# Patient Record
Sex: FEMALE | Race: WHITE | URBAN - METROPOLITAN AREA
[De-identification: names, ages, dates, MRNs, and addresses within clinical notes are randomized per-mention and may not be internally consistent; named-entity substitution may affect disease eponyms.]

---

## 2017-10-14 ENCOUNTER — TRANSFERRED RECORDS (OUTPATIENT)
Dept: HEALTH INFORMATION MANAGEMENT | Facility: CLINIC | Age: 22
End: 2017-10-14

## 2017-10-14 LAB
ALT SERPL-CCNC: 30 U/L (ref 6–29)
AST SERPL-CCNC: 20 U/L (ref 10–30)
CHOLEST SERPL-MCNC: 209 MG/DL
CREAT SERPL-MCNC: 0.6 MG/DL (ref 0.5–1.1)
GFR SERPL CREATININE-BSD FRML MDRD: 131 ML/MIN/1.73M2
GLUCOSE SERPL-MCNC: 76 MG/DL (ref 65–99)
HDLC SERPL-MCNC: 68 MG/DL
LDLC SERPL CALC-MCNC: 127 MG/DL
NONHDLC SERPL-MCNC: 141 MG/DL
POTASSIUM SERPL-SCNC: 3.6 MMOL/L (ref 3.5–5.3)
TRIGL SERPL-MCNC: 58 MG/DL
TSH SERPL-ACNC: 2.79 MIU/L

## 2017-12-01 ENCOUNTER — DOCUMENTATION ONLY (OUTPATIENT)
Dept: ALLERGY | Facility: CLINIC | Age: 22
End: 2017-12-01

## 2017-12-01 NOTE — PROGRESS NOTES
This patient has an upcoming appointment. I received blood testing records from pediatrician in [facility].     Egg white 0.80  Peanut 0.70  Wheat 0.77  [place] 0.18  Codfish less than 0.10  Milk 0.11  Soy bean 0.25  Shrimp less than 0.10  Scallop less than 0.10  Sesame seed 0.83  Hazelnut 0.50  Cashew 0.15  [place] 0.20  Thor less than 0.10  Tuna less than 0.10    Absolute eosinophil count of 561. Normal TSH. AST slightly elevated at 30. Otherwise normal T4, T3, thyroglobulin antibodies, thyroid peroxidase antibodies, CBC, vitamin D, CMP.

## 2017-12-04 ENCOUNTER — OFFICE VISIT (OUTPATIENT)
Dept: ALLERGY | Facility: CLINIC | Age: 22
End: 2017-12-04
Payer: COMMERCIAL

## 2017-12-04 VITALS
DIASTOLIC BLOOD PRESSURE: 66 MMHG | HEIGHT: 63 IN | HEART RATE: 78 BPM | TEMPERATURE: 97.9 F | OXYGEN SATURATION: 99 % | BODY MASS INDEX: 24.13 KG/M2 | RESPIRATION RATE: 16 BRPM | WEIGHT: 136.2 LBS | SYSTOLIC BLOOD PRESSURE: 131 MMHG

## 2017-12-04 DIAGNOSIS — R14.3 FLATULENCE, ERUCTATION, AND GAS PAIN: ICD-10-CM

## 2017-12-04 DIAGNOSIS — K90.49 FOOD INTOLERANCE IN ADULT: ICD-10-CM

## 2017-12-04 DIAGNOSIS — J30.1 CHRONIC SEASONAL ALLERGIC RHINITIS DUE TO POLLEN: Primary | ICD-10-CM

## 2017-12-04 DIAGNOSIS — J30.81 ALLERGIC RHINITIS DUE TO ANIMAL DANDER: ICD-10-CM

## 2017-12-04 DIAGNOSIS — R14.2 FLATULENCE, ERUCTATION, AND GAS PAIN: ICD-10-CM

## 2017-12-04 DIAGNOSIS — R14.1 FLATULENCE, ERUCTATION, AND GAS PAIN: ICD-10-CM

## 2017-12-04 DIAGNOSIS — J30.89 ALLERGIC RHINITIS DUE TO MOLD: ICD-10-CM

## 2017-12-04 DIAGNOSIS — J30.89 ALLERGIC RHINITIS DUE TO DUST MITE: ICD-10-CM

## 2017-12-04 PROCEDURE — 99204 OFFICE O/P NEW MOD 45 MIN: CPT | Mod: 25 | Performed by: ALLERGY & IMMUNOLOGY

## 2017-12-04 PROCEDURE — 95004 PERQ TESTS W/ALRGNC XTRCS: CPT | Performed by: ALLERGY & IMMUNOLOGY

## 2017-12-04 RX ORDER — FLUTICASONE PROPIONATE 50 MCG
2 SPRAY, SUSPENSION (ML) NASAL DAILY
Qty: 1 BOTTLE | Refills: 11 | Status: SHIPPED | OUTPATIENT
Start: 2017-12-04

## 2017-12-04 NOTE — MR AVS SNAPSHOT
After Visit Summary   12/4/2017    Sahra Escalona    MRN: [id number]           Patient Information     Date Of Birth          1995        Visit Information        Provider Department      12/4/2017 3:20 PM Alber Friedman DO [facility]        Today's Diagnoses     Chronic seasonal allergic rhinitis due to pollen    -  1    Flatulence, eructation, and gas pain          Care Instructions    Allergy Staff Appt Hours Shot Hours Locations    Physician     Alber Friedman DO       Support Staff     KWESI Jacobo MA  Monday:                      [place] 8-7     Tuesday:         [place] 8-5     Wednesday:        [place]: 7-5     Friday:        [place] 7-5   [place]        Monday: 9-6        Friday: 7-2     [place]        Tuesday: 7-10:45        Thursday: 1:30-6:30     [place] Tuesday: 1-7        Wednesday: 11-6 Thursday: 7-12 [facility]  [address] [place] [address]  Appt Line: [phone]  Allergy RN (Monday):  [phone]    [facility]  [address] [place] [address]  Appt Line: [phone]  Allergy RN (Tues & Wed):  [phone]    [facility]  [address] [place] [address]  Appt Line: [phone]  Allergy RN (Friday):  [phone]       Important Scheduling Information  Aspirin Desensitization: Appt will last 2 clinic days. Please call the Allergy RN line for your clinic to schedule. Discontinue antihistamines 7 days prior to the appointment.     Food Challenges: Appt will last 3-4 hours. Please call the Allergy RN line for your clinic to schedule. Discontinue antihistamines 7 days prior to the appointment.     Penicillin Testing: Appt will last 2-3 hours. Please call the Allergy RN line for your clinic to schedule. Discontinue antihistamines 7 days prior to the appointment.     Skin Testing: Appt will about 40 minutes. Call the appointment line for your clinic to schedule. Discontinue  antihistamines 7 days prior to the appointment.     Venom Testing: Appt will last 2-3 hours. Please call the Allergy RN line for your clinic to schedule. Discontinue antihistamines 7 days prior to the appointment.     Thank you for trusting us with your Allergy, Asthma, and Immunology care. Please feel free to contact us with any questions or concerns you may have.      - Flonase 2 sprays/nostril daily in the spring through fall.   - Allergen avoidance measures as noted below.   - Zyrtec 10mg by mouth daily.   - GI referral.   - I do not think you have food allergies, but maybe intolerances. Try reintroducing foods one at a time to determine if you tolerate.       AEROALLERGEN AVOIDANCE INSTRUCTIONS  MOLD  Indoors, mold season is year round. Outdoors, most mold prefer seasons with high humidity. Mold prefers damp, dark, warm places. Here are some tips on how to avoid mold exposure.  1.  Keep humidity inside between 35-50% with air conditioning or dehumidifier. The humidity level can be checked with a meter from a hardware store.   2.  Clean surfaces where mold grows and dry wet areas.  3.  Avoid steam cleaning carpets and discard moldy belongings.  4.  Wear a mask when doing yard work and refrain from walking through uncut fields or playing in leaves.  5.  Minimize use of potted plants and do not keep them indoors.  6.  Consider an allergy cover for the pillow and mattress.  POLLEN  Pollens are the tiny airborne particles given off by trees, weeds, and grasses. They can be the cause of seasonal allergic rhinitis or hay fever symptoms, which include stuffy, itchy, runny nose, redness, swelling and itching of the eyes, and itching of the ears and throat. Here are some tips on how to avoid pollen exposure.  1. .Keep windows closed and use the air conditioner when possible.  2.  Avoid outside exposure in the early morning as pollen counts are highest at that time.  3.  Take a shower and wash hair each night.  4.   Consider wearing a mask when working in the yard and/or garden.  5.  Clean furnace filter monthly with HEPA filters. Consider a HEPA filter vacuum  which will prevent pollen from being reintroduced into the air.   DUST MITES  Dust mites can never be entirely eliminated in the house no matter how clean your house is. Dust mites are attracted to warm, moist areas and feed on dead skin flakes. Here are tips to minimize dust mites in your home.  1.  Encase pillows and mattress/box springs in zippered allergy covers.  2.  Wash bedding in hot water (at least 130 F) every 7-14 days.  3.  Avoid curtains, carpet, and upholstered furniture if possible.  4.  Use HEPA air filters and a HEPA filter vacuum . Change filters monthly. Vacuum weekly.  5.  Keep bedroom simple, avoiding clutter, so it can quickly be dusted.  6.  Cover heating vents with vent filters.  7.  Keep stuffed toys in a closed container and wash or freeze regularly.  8.  Keep clothing in the closet with the door closed.  PETS  Pets present many problems for people with allergies. Dander from pets is very difficult to remove and also is a food source for dust mites.  1.  If possible, find the pet a new home.  2.  If not possible, keep the pet outdoors. Never allow the pet into the bedroom.  3.  Wash pet weekly in warm water.  4.  Encase mattresses, pillows, and box springs in allergen-proof covers.  5.  Use HEPA air filters and a HEPA filter vacuum . Change filters monthly.      Controlling Allergens: Dust Mites  Constant exposure to allergens means constant allergy symptoms. That s why controlling or avoiding the allergens that cause your symptoms is an important part of your treatment. If you are allergic to dust mites, the tips below can help to lessen your exposure to dust mites.     Wash all bedding in hot water.   Dust mite allergy  Dust mites are a common cause of nasal allergies. These mites are tiny organisms that live in bedding,  upholstered furniture, and carpet. They live in warm, humid conditions. House-dust mites are almost impossible to get rid of. But you can keep them under control.  Make changes to your home  Some furniture, like sofas and chairs, hold dust mites. To lessen the problem:    Choose nonfabric upholstery, like leather or vinyl.    Replace horizontal blinds with pull-down shades or vertical blinds.    Use washable curtains instead of heavy drapes.    Have as little carpeting as possible.    Cover your mattress, box spring, and pillows in allergy-proof casings.  Housecleaning  Here are some tips:    Wash sheets, blankets, and mattress pads every 1 to 2 weeks in hot water (at least 130 F).    Remove stuffed animals and other things that collect dust, such as wall hangings, knickknacks, and books--especially in the bedroom.    Dust your home every week with a damp cloth. Vacuum once a week. Use HEPA (high efficiency particulate air) filters or double-ply bags in the vacuum . Or, use a vacuum designed to lessen allergens.    If someone else can t dust and vacuum for you, wearing a filter mask may help.  Reduce indoor humidity  Dust mites need moist air to live. Use a dehumidifier to reduce air moisture. Don t use humidifiers, or vaporizers.  Talk with your healthcare provider about other ways to reduce dust in your home. Ask about medicines that can help with your allergy symptoms.  Date Last Reviewed: 9/1/2016 2000-2017 [employer]. [address] [place] [address], [place] [address]. All rights reserved. This information is not intended as a substitute for professional medical care. Always follow your healthcare professional's instructions.        Controlling Allergens: Dust Mites in the Bedroom    Many people with asthma are allergic to dust mites. Dust mites are tiny bugs that live in warm, damp places. They are too small to see, but they live in mattresses, pillows, upholstered furniture, and house dust.  Dust mite allergy can cause asthma flare-ups. If you have this allergy, there are many steps you can take to control dust mites at home.  Take these steps to control dust mites in your bed and bedroom:  1. Keep all clothing in a closet, with the door shut.  2. Make sure the room is not too humid. It should be below 50% humidity.  3. Choose wood, leather, or vinyl for furniture instead of upholstery.  4. Wash all bedding, pillows, and stuffed toys in hot water (130 F) every week. Remove any items that cannot be washed.  5. Use special covers on pillows, mattresses, and box springs. These covers are made for people with allergies.  6. If you can, replace carpeting with tile or hardwood zuleima. Use washable throw rugs, or don't use rugs at all.  7. Dust furniture with a damp cloth at least once a week.  8. Use an air conditioner or a dehumidifier to reduce humidity and filter the air. Clean the filter regularly.  9. Use pull-down shades or vertical window blinds that can be easily cleaned. Use these instead of curtains or drapes.  10. Use filters over heater vents.  Date Last Reviewed: 10/1/2016    1054-8383 [employer]. [address] [place] [address], [place] [id number]. All rights reserved. This information is not intended as a substitute for professional medical care. Always follow your healthcare professional's instructions.                Follow-ups after your visit        Additional Services     GASTROENTEROLOGY ADULT REF CONSULT ONLY       Preferred Location: [facility] [phone]      Please be aware that coverage of these services is subject to the terms and limitations of your health insurance plan.  Call member services at your health plan with any benefit or coverage questions.  Any procedures must be performed at a [facility] facility OR coordinated by your clinic's referral office.    Please bring the following with you to your appointment:    (1) Any X-Rays, CTs or MRIs which have been performed.   "Contact the facility where they were done to arrange for  prior to your scheduled appointment.    (2) List of current medications   (3) This referral request   (4) Any documents/labs given to you for this referral                  Who to contact     If you have questions or need follow up information about today's clinic visit or your schedule please contact [facility] directly at [phone].  Normal or non-critical lab and imaging results will be communicated to you by MyChart, letter or phone within 4 business days after the clinic has received the results. If you do not hear from us within 7 days, please contact the clinic through [employer] or phone. If you have a critical or abnormal lab result, we will notify you by phone as soon as possible.  Submit refill requests through [employer] or call your pharmacy and they will forward the refill request to us. Please allow 3 business days for your refill to be completed.          Additional Information About Your Visit        [employer] Information     [employer] lets you send messages to your doctor, view your test results, renew your prescriptions, schedule appointments and more. To sign up, go to [email or web address] . Click on \"Log in\" on the left side of the screen, which will take you to the Welcome page. Then click on \"Sign up Now\" on the right side of the page.     You will be asked to enter the access code listed below, as well as some personal information. Please follow the directions to create your username and password.     Your access code is: [email or web address]  Expires: 3/4/2018  4:45 PM     Your access code will  in 90 days. If you need help or a new code, please call your [facility] or [phone].        Care EveryWhere ID     This is your Care EveryWhere ID. This could be used by other organizations to access your [facility] medical records  [id number]        Your Vitals Were     Pulse Temperature Respirations Height Pulse " "Oximetry BMI (Body Mass Index)    78 97.9  F (36.6  C) (Oral) 16 1.594 m (5' 2.76\") 99% 24.31 kg/m2       Blood Pressure from Last 3 Encounters:   12/04/17 131/66    Weight from Last 3 Encounters:   12/04/17 61.8 kg (136 lb 3.2 oz)              We Performed the Following     GASTROENTEROLOGY ADULT REF CONSULT ONLY          Today's Medication Changes          These changes are accurate as of: 12/4/17  4:47 PM.  If you have any questions, ask your nurse or doctor.               Start taking these medicines.        Dose/Directions    fluticasone 50 MCG/ACT spray   Commonly known as:  FLONASE   Used for:  Chronic seasonal allergic rhinitis due to pollen   Started by:  Alber Friedman,         Dose:  2 spray   Spray 2 sprays into both nostrils daily   Quantity:  1 Bottle   Refills:  11            Where to get your medicines      These medications were sent to [facility] [address] [place] [address] [address] AT [place] [id number], [facility] [id number]    Hours:  24-hours Phone:  [phone]     fluticasone 50 MCG/ACT spray                Primary Care Provider Fax #    Physician No Ref-Primary [phone]       No address on file        Equal Access to Services     JOSEFINA MEDINA AH: Hadii obdulio galvan hadasho Soomaali, waaxda luqadaha, qaybta kaalmada adeegyada, waxay andrea haypaul white. So [facility] [phone].    ATENCIÓN: Si habla español, tiene a hampton disposición servicios gratuitos de asistencia lingüística. Llame al [phone].    We comply with applicable federal civil rights laws and [place] laws. We do not discriminate on the basis of race, color, national origin, age, disability, sex, sexual orientation, or gender identity.            Thank you!     Thank you for [facility]  [facility]. Our goal is always to provide you with excellent care. Hearing back from our patients is one way we can continue to improve our services. Please " take a few minutes to complete the written survey that you may receive in the mail after your visit with us. Thank you!             Your Updated Medication List - Protect others around you: Learn how to safely use, store and throw away your medicines at www.disposemymeds.org.          This list is accurate as of: 12/4/17  4:47 PM.  Always use your most recent med list.                   Brand Name Dispense Instructions for use Diagnosis    fluticasone 50 MCG/ACT spray    FLONASE    1 Bottle    Spray 2 sprays into both nostrils daily    Chronic seasonal allergic rhinitis due to pollen

## 2017-12-04 NOTE — PATIENT INSTRUCTIONS
Allergy Staff Appt Hours Shot Hours Locations    Physician     Alber Friedman DO       Support Staff     Deepti CHAVEZ RN      Ladonna GO MA  Monday:                      [place] 8-7 Tuesday:         [place] 8-5 Wednesday:        [place]: 7-5     Friday:        [place] 7-5   [place]        Monday: 9-6        Friday: 7-2     [place]        Tuesday: 7-10:45        Thursday: 1:30-6:30     [place] Tuesday: 1-7        Wednesday: 11-6 Thursday: 7-12 [facility]  [address] [place] [address]  Appt Line: [phone]  Allergy RN (Monday):  [phone]    [facility]  [address] [place] [address]  Appt Line: [phone]  Allergy RN (Tues & Wed):  [phone]    [facility]  [address] [place] [address]  Appt Line: [phone]  Allergy RN (Friday):  [phone]       Important Scheduling Information  Aspirin Desensitization: Appt will last 2 clinic days. Please call the Allergy RN line for your clinic to schedule. Discontinue antihistamines 7 days prior to the appointment.     Food Challenges: Appt will last 3-4 hours. Please call the Allergy RN line for your clinic to schedule. Discontinue antihistamines 7 days prior to the appointment.     Penicillin Testing: Appt will last 2-3 hours. Please call the Allergy RN line for your clinic to schedule. Discontinue antihistamines 7 days prior to the appointment.     Skin Testing: Appt will about 40 minutes. Call the appointment line for your clinic to schedule. Discontinue antihistamines 7 days prior to the appointment.     Venom Testing: Appt will last 2-3 hours. Please call the Allergy RN line for your clinic to schedule. Discontinue antihistamines 7 days prior to the appointment.     Thank you for trusting us with your Allergy, Asthma, and Immunology care. Please feel free to contact us with any questions or concerns you may have.      - Flonase 2 sprays/nostril daily in the spring through fall.    - Allergen avoidance measures as noted below.   - Zyrtec 10mg by mouth daily.   - GI referral.   - I do not think you have food allergies, but maybe intolerances. Try reintroducing foods one at a time to determine if you tolerate.       AEROALLERGEN AVOIDANCE INSTRUCTIONS  MOLD  Indoors, mold season is year round. Outdoors, most mold prefer seasons with high humidity. Mold prefers damp, dark, warm places. Here are some tips on how to avoid mold exposure.  1.  Keep humidity inside between 35-50% with air conditioning or dehumidifier. The humidity level can be checked with a meter from a hardware store.   2.  Clean surfaces where mold grows and dry wet areas.  3.  Avoid steam cleaning carpets and discard moldy belongings.  4.  Wear a mask when doing yard work and refrain from walking through uncut fields or playing in leaves.  5.  Minimize use of potted plants and do not keep them indoors.  6.  Consider an allergy cover for the pillow and mattress.  POLLEN  Pollens are the tiny airborne particles given off by trees, weeds, and grasses. They can be the cause of seasonal allergic rhinitis or hay fever symptoms, which include stuffy, itchy, runny nose, redness, swelling and itching of the eyes, and itching of the ears and throat. Here are some tips on how to avoid pollen exposure.  1. .Keep windows closed and use the air conditioner when possible.  2.  Avoid outside exposure in the early morning as pollen counts are highest at that time.  3.  Take a shower and wash hair each night.  4.  Consider wearing a mask when working in the yard and/or garden.  5.  Clean furnace filter monthly with HEPA filters. Consider a HEPA filter vacuum  which will prevent pollen from being reintroduced into the air.   DUST MITES  Dust mites can never be entirely eliminated in the house no matter how clean your house is. Dust mites are attracted to warm, moist areas and feed on dead skin flakes. Here are tips to minimize dust mites in  your home.  1.  Encase pillows and mattress/box springs in zippered allergy covers.  2.  Wash bedding in hot water (at least 130 F) every 7-14 days.  3.  Avoid curtains, carpet, and upholstered furniture if possible.  4.  Use HEPA air filters and a HEPA filter vacuum . Change filters monthly. Vacuum weekly.  5.  Keep bedroom simple, avoiding clutter, so it can quickly be dusted.  6.  Cover heating vents with vent filters.  7.  Keep stuffed toys in a closed container and wash or freeze regularly.  8.  Keep clothing in the closet with the door closed.  PETS  Pets present many problems for people with allergies. Dander from pets is very difficult to remove and also is a food source for dust mites.  1.  If possible, find the pet a new home.  2.  If not possible, keep the pet outdoors. Never allow the pet into the bedroom.  3.  Wash pet weekly in warm water.  4.  Encase mattresses, pillows, and box springs in allergen-proof covers.  5.  Use HEPA air filters and a HEPA filter vacuum . Change filters monthly.      Controlling Allergens: Dust Mites  Constant exposure to allergens means constant allergy symptoms. That s why controlling or avoiding the allergens that cause your symptoms is an important part of your treatment. If you are allergic to dust mites, the tips below can help to lessen your exposure to dust mites.     Wash all bedding in hot water.   Dust mite allergy  Dust mites are a common cause of nasal allergies. These mites are tiny organisms that live in bedding, upholstered furniture, and carpet. They live in warm, humid conditions. House-dust mites are almost impossible to get rid of. But you can keep them under control.  Make changes to your home  Some furniture, like sofas and chairs, hold dust mites. To lessen the problem:    Choose nonfabric upholstery, like leather or vinyl.    Replace horizontal blinds with pull-down shades or vertical blinds.    Use washable curtains instead of heavy  drapes.    Have as little carpeting as possible.    Cover your mattress, box spring, and pillows in allergy-proof casings.  Housecleaning  Here are some tips:    Wash sheets, blankets, and mattress pads every 1 to 2 weeks in hot water (at least 130 F).    Remove stuffed animals and other things that collect dust, such as wall hangings, knickknacks, and books--especially in the bedroom.    Dust your home every week with a damp cloth. Vacuum once a week. Use HEPA (high efficiency particulate air) filters or double-ply bags in the vacuum . Or, use a vacuum designed to lessen allergens.    If someone else can t dust and vacuum for you, wearing a filter mask may help.  Reduce indoor humidity  Dust mites need moist air to live. Use a dehumidifier to reduce air moisture. Don t use humidifiers, or vaporizers.  Talk with your healthcare provider about other ways to reduce dust in your home. Ask about medicines that can help with your allergy symptoms.  Date Last Reviewed: 9/1/2016 2000-2017 [employer]. [address] [place] [address]. All rights reserved. This information is not intended as a substitute for professional medical care. Always follow your healthcare professional's instructions.        Controlling Allergens: Dust Mites in the Bedroom    Many people with asthma are allergic to dust mites. Dust mites are tiny bugs that live in warm, damp places. They are too small to see, but they live in mattresses, pillows, upholstered furniture, and house dust. Dust mite allergy can cause asthma flare-ups. If you have this allergy, there are many steps you can take to control dust mites at home.  Take these steps to control dust mites in your bed and bedroom:  1. Keep all clothing in a closet, with the door shut.  2. Make sure the room is not too humid. It should be below 50% humidity.  3. Choose wood, leather, or vinyl for furniture instead of upholstery.  4. Wash all bedding, pillows, and  stuffed toys in hot water (130 F) every week. Remove any items that cannot be washed.  5. Use special covers on pillows, mattresses, and box springs. These covers are made for people with allergies.  6. If you can, replace carpeting with tile or hardwood zuleima. Use washable throw rugs, or don't use rugs at all.  7. Dust furniture with a damp cloth at least once a week.  8. Use an air conditioner or a dehumidifier to reduce humidity and filter the air. Clean the filter regularly.  9. Use pull-down shades or vertical window blinds that can be easily cleaned. Use these instead of curtains or drapes.  10. Use filters over heater vents.  Date Last Reviewed: 10/1/2016    8626-2955 [employer]. [address] [place] [address], [place] [address]. All rights reserved. This information is not intended as a substitute for professional medical care. Always follow your healthcare professional's instructions.

## 2017-12-04 NOTE — LETTER
12/4/2017         RE: Sahra Escalona  [address]  [place] [address]        Dear Colleague,    Thank you for referring your patient, Sahra Escalona, to the [facility]. Please see a copy of my visit note below.    Sahra Escalona is a 22 year old White female with previous medical history significant for allergic rhinitis and asthma in childhood. Sahra Escalona is being seen today for evaluation of chronic hives and seasonal allergies.     The patient reports that since fifth or sixth grade she has had swelling of her hand and ankles. She reports constant bloating. She reports daily nausea. She reports sensation of lightheadedness. She reports frequent migraines. She reports that all of these symptoms were present year-round and most days of the week. She reports her symptoms worsened in March 2017. She underwent thyroid testing which was normal. She had normal CMP aside from slightly elevated AST. She had normal CBC, vitamin D. She had testing for food allergies as noted below. She removed wheat from her diet and noted improvement in her migraines. When she put wheat back into her diet she developed hives immediately without other IgE mediated symptoms. She has now removed milk, soy, eggs and all nuts from her diet. She denies blood in her stool. She denies diarrhea. She has never had celiac testing. She has not noted any symptoms reproducibly with milk causing abdominal bloating. She has noted no IgE mediated food allergy symptoms including hives (aside from symptoms with wheat), angioedema, vomiting, diarrhea, wheezing, shortness of breath, tightness in chest. She does get hives with heat and exercise. She reports that in the summer she'll have sneezing, ocular itching, ocular watering, throat itching, congestion, rhinorrhea and post nasal drainage. She has increased symptoms around cats and dogs. She has never had allergy testing. Benadryl has been used as needed and  helpful. No use of nasal corticosteroid. She had history of asthma in childhood that was probably exercise-induced. She no longer has any breathing issues.    Egg white 0.80  Peanut 0.70  Wheat 0.77  [place] 0.18  Codfish less than 0.10  Milk 0.11  Soy bean 0.25  Shrimp less than 0.10  Scallop less than 0.10  Sesame seed 0.83  Hazelnut 0.50  Cashew 0.15  Dorothy 0.20  [place] less than 0.10  Tuna less than 0.10    ENVIRONMENTAL HISTORY: [place]. The home is heated with a forced air. They does not have central air conditioning. The patient's bedroom is furnished with carpeting in bedroom.  Pets inside the house include 0 . There is not history of cockroach or mice infestation. There is/are 0 smokers in the house.  The house does not have a damp basement.     History reviewed. No pertinent past medical history.  History reviewed. No pertinent family history.  History reviewed. No pertinent surgical history.    REVIEW OF SYSTEMS:  General: negative for weight gain. positive  for weight loss. negative for changes in sleep.   Ears: negative for fullness. negative for hearing loss. negative for dizziness.   Nose: negative for snoring.negative for changes in smell. negative for drainage.   Eyes: negative for eye watering. negative for eye itching. negative for vision changes. negative for eye redness.  Throat: negative for hoarseness. negative for sore throat. negative for trouble swallowing.   Lungs: negative for shortness of breath.negative for wheezing. negative for sputum production.   Cardiovascular: negative for chest pain. positive  for swelling of ankles. negative for fast or irregular heartbeat.   Gastrointestinal: positive  for nausea. negative for heartburn. negative for acid reflux.   Musculoskeletal: negative for joint pain. negative for joint stiffness. negative for joint swelling.   Neurologic: negative for seizures. negative for fainting. negative for weakness.    Psychiatric: negative for changes in mood. negative for anxiety.   Endocrine: negative for cold intolerance. negative for heat intolerance. negative for tremors.   Lymphatic: positive  for lower extremity swelling. negative for lymph node swelling.   Hematologic: negative for easy bruising. negative for easy bleeding.  Integumentary: negative for rash. negative for scaling. negative for nail changes.       Current Outpatient Prescriptions:      fluticasone (FLONASE) 50 MCG/ACT spray, Spray 2 sprays into both nostrils daily, Disp: 1 Bottle, Rfl: 11    There is no immunization history on file for this patient.  No Known Allergies      EXAM:   Constitutional:  Appears well-developed and well-nourished. No distress.   HEENT:   Head: Normocephalic.   Right Ear: External ear normal. TM normal  Left Ear: External ear normal. TM normal  Mouth/Throat: No oropharyngeal exudate present.   Cobblestoning of posterior oropharynx.   Boggy nasal tissue and pale.    Eyes: Conjunctivae are non-erythematous   No maxillary or frontal sinus tenderness to palpation.   Cardiovascular: Normal rate, regular rhythm and normal heart sounds. Exam reveals no gallop and no friction rub.   No murmur heard.  Respiratory: Effort normal and breath sounds normal. No respiratory distress. No wheezes. No rales.   Musculoskeletal: Normal range of motion.   Lymphadenopathy:   No cervical adenopathy.   No lower extremity edema.   Neuro: Oriented to person, place, and time.  Skin: Skin is warm and dry. No rash noted.   Psychiatric: Normal mood and affect.     Nursing note and vitals reviewed.      WORKUP:   Skin testing  Positive for cat, dog, dust mite, grass, trees, weeds and molds.  Negative testing for wheat.  ASSESSMENT/PLAN:  Problem List Items Addressed This Visit        Nervous and Auditory    Flatulence, eructation, and gas pain     Chronic nausea, bloating, migraines, sensation of lightheadedness. Food panel was obtained by her primary and  mildly positive for egg, peanut, wheat, walnut, soy, sesame seed, hazelnut, cashew, almond. No IgE mediated symptoms with these foods aside from potential hives after consuming wheat upon reintroduction after she avoided. Currently she is avoiding milk, soy, eggs and all nuts. This is caused improvement in her symptoms. She is additionally avoiding wheat.    - I reviewed blood testing results. I do not think she has IgE mediated food allergy. These are false positive results. No reproducible symptoms with any of these foods. She is going to trial reintroducing each of these foods one at a time. She may have intolerances, but not food allergy. Not likely celiac disease or lactose intolerance.   - GI referral for chronic abdominal symptoms.   - Negative allergy testing for wheat. She is okay to resume consuming wheat. However, if she feels better off wheat she may have gluten intolerance and she may continue to avoid. No testing available for this. Could not obtain celiac testing as she is on a gluten free diet.            Relevant Orders    GASTROENTEROLOGY ADULT REF CONSULT ONLY    ALLERGY SKIN TESTS,ALLERGENS       Respiratory    Chronic seasonal allergic rhinitis due to pollen - Primary     Nasal and ocular symptoms around cats and dogs. Some are sneezing, ocular itching, ocular watering, throat itching congestion, rhinorrhea and postnasal drainage. Symptoms improved with diphenhydramine. No history of allergy testing.    Skin testing  Positive for cat, dog, dust mite, grass, trees, weeds and molds.    - Flonase 2 sprays/nostril daily.   - Zyrtec daily as needed for allergy symptoms.   - Aeroallergen avoidance measures discussed and literature provided.              Relevant Medications    fluticasone (FLONASE) 50 MCG/ACT spray    Other Relevant Orders    ALLERGY SKIN TESTS,ALLERGENS    Allergic rhinitis due to dust mite    Relevant Medications    fluticasone (FLONASE) 50 MCG/ACT spray    Other Relevant Orders     ALLERGY SKIN TESTS,ALLERGENS    Allergic rhinitis due to mold    Relevant Medications    fluticasone (FLONASE) 50 MCG/ACT spray    Other Relevant Orders    ALLERGY SKIN TESTS,ALLERGENS    Allergic rhinitis due to animal dander    Relevant Medications    fluticasone (FLONASE) 50 MCG/ACT spray    Other Relevant Orders    ALLERGY SKIN TESTS,ALLERGENS       Other    Food intolerance in adult    Relevant Orders    ALLERGY SKIN TESTS,ALLERGENS        Return as needed.     Chart documentation with Dragon Voice recognition Software. Although reviewed after completion, some words and grammatical errors may remain.    Alber Friedman DO   Allergy/Immunology  [facility] [address] [place]        Again, thank you for allowing me to participate in the care of your patient.        Sincerely,        Alber Friedman, DO

## 2017-12-04 NOTE — ASSESSMENT & PLAN NOTE
Chronic nausea, bloating, migraines, sensation of lightheadedness. Food panel was obtained by her primary and mildly positive for egg, peanut, wheat, walnut, soy, sesame seed, hazelnut, cashew, almond. No IgE mediated symptoms with these foods aside from potential hives after consuming wheat upon reintroduction after she avoided. Currently she is avoiding milk, soy, eggs and all nuts. This is caused improvement in her symptoms. She is additionally avoiding wheat.    - I reviewed blood testing results. I do not think she has IgE mediated food allergy. These are false positive results. No reproducible symptoms with any of these foods. She is going to trial reintroducing each of these foods one at a time. She may have intolerances, but not food allergy. Not likely celiac disease or lactose intolerance.   - GI referral for chronic abdominal symptoms.   - Negative allergy testing for wheat. She is okay to resume consuming wheat. However, if she feels better off wheat she may have gluten intolerance and she may continue to avoid. No testing available for this. Could not obtain celiac testing as she is on a gluten free diet.

## 2018-04-27 ENCOUNTER — OFFICE VISIT (OUTPATIENT)
Dept: URGENT CARE | Facility: URGENT CARE | Age: 23
End: 2018-04-27
Payer: COMMERCIAL

## 2018-04-27 ENCOUNTER — RADIANT APPOINTMENT (OUTPATIENT)
Dept: GENERAL RADIOLOGY | Facility: CLINIC | Age: 23
End: 2018-04-27
Attending: FAMILY MEDICINE
Payer: COMMERCIAL

## 2018-04-27 VITALS
HEART RATE: 81 BPM | OXYGEN SATURATION: 100 % | BODY MASS INDEX: 25.36 KG/M2 | SYSTOLIC BLOOD PRESSURE: 114 MMHG | DIASTOLIC BLOOD PRESSURE: 62 MMHG | WEIGHT: 142.06 LBS | TEMPERATURE: 98.5 F

## 2018-04-27 DIAGNOSIS — S59.901A ELBOW INJURY, RIGHT, INITIAL ENCOUNTER: Primary | ICD-10-CM

## 2018-04-27 PROCEDURE — 73080 X-RAY EXAM OF ELBOW: CPT | Mod: RT

## 2018-04-27 PROCEDURE — 99213 OFFICE O/P EST LOW 20 MIN: CPT | Performed by: FAMILY MEDICINE

## 2018-04-27 NOTE — PROGRESS NOTES
Chief Complaint   Patient presents with     Pain     Right arm injury appears to be swollen in the forearm elbow area Incident ocurred on Wednesday     SUBJECTIVE:  Sahra Escalona is a 22 year old female who sustained a right elbow injury 2 days ago. Mechanism of injury: fell on outstretched hand . Immediate symptoms: immediate pain, immediate swelling, inability to use arm directly after injury. Symptoms have been gradual since that time. Prior history of related problems: no prior problems with this area in the past.    CONSTITUTIONAL:no fever, no chills or sweats, no excessive fatigue, no significant change in weight  CV: neg   RESP - neg   GI:  Neg   NEURO: ,neg   MSK -rt elbow injury leading to swelling and pain  In the medial elbow area   Skin - neg   Pyschiatry-normal mental status , good affect         OBJECTIVE:  Vital signs as noted above.  Appearance: in no apparent distress.  Elbow exam: soft tissue tenderness and swelling at the medial epicondyle.  X-ray: no fracture or dislocation noted.    ASSESSMENT:  elbow contusion    PLAN:  NSAID, ice suggested  activity modification  Elbow sling dispensed for the pt   Follow up if  symptoms fail to improve or worsens   Pt understood and agreed with plan   See orders in [facility].

## 2018-04-27 NOTE — MR AVS SNAPSHOT
"              After Visit Summary   2018    Sahra Escalona    MRN: [id number]           Patient Information     Date Of Birth          1995        Visit Information        Provider Department      2018 6:15 PM Samantha Gregory MD [facility]        Today's Diagnoses     Elbow injury, right, initial encounter    -  1       Follow-ups after your visit        Who to contact     If you have questions or need follow up information about today's clinic visit or your schedule please contact [facility] directly at [phone].  Normal or non-critical lab and imaging results will be communicated to you by [email or web address], letter or phone within 4 business days after the clinic has received the results. If you do not hear from us within 7 days, please contact the clinic through [email or web address] or phone. If you have a critical or abnormal lab result, we will notify you by phone as soon as possible.  Submit refill requests through [employer] or call your pharmacy and they will forward the refill request to us. Please allow 3 business days for your refill to be completed.          Additional Information About Your Visit        MyChart Information     [employer] lets you send messages to your doctor, view your test results, renew your prescriptions, schedule appointments and more. To sign up, go to [email or web address] . Click on \"Log in\" on the left side of the screen, which will take you to the Welcome page. Then click on \"Sign up Now\" on the right side of the page.     You will be asked to enter the access code listed below, as well as some personal information. Please follow the directions to create your username and password.     Your access code is: [email or web address]  Expires: 2018  7:58 PM     Your access code will  in 90 days. If you need help or a new code, please call your [place] clinic or [phone].        Care EveryWhere ID     This is your Care " EveryWhere ID. This could be used by other organizations to access your [facility] medical records  [id number]        Your Vitals Were     Pulse Temperature Pulse Oximetry BMI (Body Mass Index)          81 98.5  F (36.9  C) (Oral) 100% 25.36 kg/m2         Blood Pressure from Last 3 Encounters:   04/27/18 114/62   12/04/17 131/66    Weight from Last 3 Encounters:   04/27/18 142 lb 1 oz (64.4 kg)   12/04/17 136 lb 3.2 oz (61.8 kg)              We Performed the Following     XR Elbow Port Right G/E 3 Views        Primary Care Provider Fax #    Physician No Ref-Primary [phone]       No address on file        Equal Access to Services     JOSEFINA MEDINA : Hadii obdulio Souza, washravan arthur, [place] kaalmada abdi, will john . So [facility] [phone].    ATENCIÓN: Si habla español, tiene a hampton disposición servicios gratuitos de asistencia lingüística. Llame al [phone].    We comply with applicable federal civil rights laws and [place] laws. We do not discriminate on the basis of race, color, national origin, age, disability, sex, sexual orientation, or gender identity.            Thank you!     Thank you for choosing [facility]  for your care. Our goal is always to provide you with excellent care. Hearing back from our patients is one way we can continue to improve our services. Please take a few minutes to complete the written survey that you may receive in the mail after your visit with us. Thank you!             Your Updated Medication List - Protect others around you: Learn how to safely use, store and throw away your medicines at www.disposemymeds.org.          This list is accurate as of 4/27/18  7:58 PM.  Always use your most recent med list.                   Brand Name Dispense Instructions for use Diagnosis    fluticasone 50 MCG/ACT spray    FLONASE    1 Bottle    Spray 2 sprays into both nostrils daily    Chronic seasonal allergic  rhinitis due to pollen